# Patient Record
Sex: MALE | Employment: OTHER | URBAN - METROPOLITAN AREA
[De-identification: names, ages, dates, MRNs, and addresses within clinical notes are randomized per-mention and may not be internally consistent; named-entity substitution may affect disease eponyms.]

---

## 2019-11-04 ENCOUNTER — ANESTHESIA EVENT (OUTPATIENT)
Dept: ENDOSCOPY | Age: 69
End: 2019-11-04
Payer: MEDICARE

## 2019-11-05 ENCOUNTER — ANESTHESIA (OUTPATIENT)
Dept: ENDOSCOPY | Age: 69
End: 2019-11-05
Payer: MEDICARE

## 2019-11-05 ENCOUNTER — HOSPITAL ENCOUNTER (OUTPATIENT)
Age: 69
Setting detail: OUTPATIENT SURGERY
Discharge: HOME | End: 2019-11-05
Attending: INTERNAL MEDICINE | Admitting: INTERNAL MEDICINE
Payer: MEDICARE

## 2019-11-05 VITALS
RESPIRATION RATE: 16 BRPM | TEMPERATURE: 98 F | WEIGHT: 175 LBS | HEART RATE: 58 BPM | DIASTOLIC BLOOD PRESSURE: 64 MMHG | SYSTOLIC BLOOD PRESSURE: 115 MMHG | BODY MASS INDEX: 24.41 KG/M2 | OXYGEN SATURATION: 99 %

## 2019-11-05 LAB
ATRIAL RATE: 49 BPM
CALCULATED P AXIS, ECG09: 75 DEGREES
CALCULATED R AXIS, ECG10: -15 DEGREES
CALCULATED T AXIS, ECG11: 50 DEGREES
DIAGNOSIS, 93000: NORMAL
P-R INTERVAL, ECG05: 146 MS
Q-T INTERVAL, ECG07: 440 MS
QRS DURATION, ECG06: 64 MS
QTC CALCULATION (BEZET), ECG08: 397 MS
VENTRICULAR RATE, ECG03: 49 BPM

## 2019-11-05 PROCEDURE — 74011250636 HC RX REV CODE- 250/636: Performed by: NURSE ANESTHETIST, CERTIFIED REGISTERED

## 2019-11-05 PROCEDURE — 93005 ELECTROCARDIOGRAM TRACING: CPT

## 2019-11-05 PROCEDURE — 77030038604 HC SNR ENDO EXACTO USEN -B: Performed by: INTERNAL MEDICINE

## 2019-11-05 PROCEDURE — 77030031670 HC DEV INFL ENDOTEK BIG60 MRTM -B: Performed by: INTERNAL MEDICINE

## 2019-11-05 PROCEDURE — 88305 TISSUE EXAM BY PATHOLOGIST: CPT

## 2019-11-05 PROCEDURE — 76040000019: Performed by: INTERNAL MEDICINE

## 2019-11-05 PROCEDURE — 76060000031 HC ANESTHESIA FIRST 0.5 HR: Performed by: INTERNAL MEDICINE

## 2019-11-05 RX ORDER — SODIUM CHLORIDE, SODIUM LACTATE, POTASSIUM CHLORIDE, CALCIUM CHLORIDE 600; 310; 30; 20 MG/100ML; MG/100ML; MG/100ML; MG/100ML
50 INJECTION, SOLUTION INTRAVENOUS CONTINUOUS
Status: DISCONTINUED | OUTPATIENT
Start: 2019-11-06 | End: 2019-11-05 | Stop reason: HOSPADM

## 2019-11-05 RX ORDER — SODIUM CHLORIDE 0.9 % (FLUSH) 0.9 %
5-40 SYRINGE (ML) INJECTION EVERY 8 HOURS
Status: CANCELLED | OUTPATIENT
Start: 2019-11-05

## 2019-11-05 RX ORDER — EPINEPHRINE 0.1 MG/ML
1 INJECTION INTRACARDIAC; INTRAVENOUS
Status: CANCELLED | OUTPATIENT
Start: 2019-11-05 | End: 2019-11-06

## 2019-11-05 RX ORDER — IBUPROFEN 600 MG/1
TABLET ORAL
COMMUNITY

## 2019-11-05 RX ORDER — DEXTROMETHORPHAN/PSEUDOEPHED 2.5-7.5/.8
1.2 DROPS ORAL
Status: CANCELLED | OUTPATIENT
Start: 2019-11-05

## 2019-11-05 RX ORDER — FLUMAZENIL 0.1 MG/ML
0.2 INJECTION INTRAVENOUS
Status: CANCELLED | OUTPATIENT
Start: 2019-11-05 | End: 2019-11-05

## 2019-11-05 RX ORDER — SODIUM CHLORIDE 0.9 % (FLUSH) 0.9 %
5-40 SYRINGE (ML) INJECTION AS NEEDED
Status: DISCONTINUED | OUTPATIENT
Start: 2019-11-05 | End: 2019-11-05 | Stop reason: HOSPADM

## 2019-11-05 RX ORDER — NALOXONE HYDROCHLORIDE 0.4 MG/ML
0.4 INJECTION, SOLUTION INTRAMUSCULAR; INTRAVENOUS; SUBCUTANEOUS
Status: CANCELLED | OUTPATIENT
Start: 2019-11-05 | End: 2019-11-05

## 2019-11-05 RX ORDER — SODIUM CHLORIDE 0.9 % (FLUSH) 0.9 %
5-40 SYRINGE (ML) INJECTION EVERY 8 HOURS
Status: DISCONTINUED | OUTPATIENT
Start: 2019-11-05 | End: 2019-11-05 | Stop reason: HOSPADM

## 2019-11-05 RX ORDER — CLOPIDOGREL BISULFATE 75 MG/1
TABLET ORAL DAILY
COMMUNITY

## 2019-11-05 RX ORDER — PROPOFOL 10 MG/ML
INJECTION, EMULSION INTRAVENOUS AS NEEDED
Status: DISCONTINUED | OUTPATIENT
Start: 2019-11-05 | End: 2019-11-05 | Stop reason: HOSPADM

## 2019-11-05 RX ORDER — INSULIN LISPRO 100 [IU]/ML
INJECTION, SOLUTION INTRAVENOUS; SUBCUTANEOUS ONCE
Status: DISCONTINUED | OUTPATIENT
Start: 2019-11-06 | End: 2019-11-05 | Stop reason: HOSPADM

## 2019-11-05 RX ORDER — ATROPINE SULFATE 0.1 MG/ML
0.5 INJECTION INTRAVENOUS
Status: CANCELLED | OUTPATIENT
Start: 2019-11-05 | End: 2019-11-06

## 2019-11-05 RX ORDER — FAMOTIDINE 20 MG/1
20 TABLET, FILM COATED ORAL ONCE
Status: DISCONTINUED | OUTPATIENT
Start: 2019-11-06 | End: 2019-11-05 | Stop reason: HOSPADM

## 2019-11-05 RX ORDER — SODIUM CHLORIDE 0.9 % (FLUSH) 0.9 %
5-40 SYRINGE (ML) INJECTION AS NEEDED
Status: CANCELLED | OUTPATIENT
Start: 2019-11-05

## 2019-11-05 RX ADMIN — PROPOFOL 20 MG: 10 INJECTION, EMULSION INTRAVENOUS at 08:25

## 2019-11-05 RX ADMIN — SODIUM CHLORIDE, SODIUM LACTATE, POTASSIUM CHLORIDE, AND CALCIUM CHLORIDE 50 ML/HR: 600; 310; 30; 20 INJECTION, SOLUTION INTRAVENOUS at 07:36

## 2019-11-05 RX ADMIN — PROPOFOL 20 MG: 10 INJECTION, EMULSION INTRAVENOUS at 08:12

## 2019-11-05 RX ADMIN — PROPOFOL 20 MG: 10 INJECTION, EMULSION INTRAVENOUS at 08:19

## 2019-11-05 RX ADMIN — PROPOFOL 30 MG: 10 INJECTION, EMULSION INTRAVENOUS at 08:09

## 2019-11-05 RX ADMIN — PROPOFOL 50 MG: 10 INJECTION, EMULSION INTRAVENOUS at 08:08

## 2019-11-05 RX ADMIN — PROPOFOL 20 MG: 10 INJECTION, EMULSION INTRAVENOUS at 08:17

## 2019-11-05 RX ADMIN — PROPOFOL 20 MG: 10 INJECTION, EMULSION INTRAVENOUS at 08:14

## 2019-11-05 RX ADMIN — PROPOFOL 20 MG: 10 INJECTION, EMULSION INTRAVENOUS at 08:22

## 2019-11-05 RX ADMIN — PROPOFOL 20 MG: 10 INJECTION, EMULSION INTRAVENOUS at 08:10

## 2019-11-05 RX ADMIN — PROPOFOL 20 MG: 10 INJECTION, EMULSION INTRAVENOUS at 08:28

## 2019-11-05 NOTE — ANESTHESIA POSTPROCEDURE EVALUATION
Procedure(s):  COLONOSCOPY. No value filed.     Anesthesia Post Evaluation      Multimodal analgesia: multimodal analgesia not used between 6 hours prior to anesthesia start to PACU discharge  Patient location during evaluation: bedside  Patient participation: complete - patient participated  Level of consciousness: awake  Pain score: 0  Pain management: adequate  Airway patency: patent  Anesthetic complications: no  Cardiovascular status: acceptable  Respiratory status: acceptable  Hydration status: acceptable  Post anesthesia nausea and vomiting:  none      Vitals Value Taken Time   /64 11/5/2019  8:31 AM   Temp 36.7 °C (98 °F) 11/5/2019  8:31 AM   Pulse 58 11/5/2019  8:31 AM   Resp 16 11/5/2019  8:31 AM   SpO2 99 % 11/5/2019  8:31 AM
electronic

## 2019-11-05 NOTE — ANESTHESIA PREPROCEDURE EVALUATION
Anesthetic History   No history of anesthetic complications            Review of Systems / Medical History  Patient summary reviewed, nursing notes reviewed and pertinent labs reviewed    Pulmonary          Smoker         Neuro/Psych              Cardiovascular    Hypertension          CAD    Exercise tolerance: >4 METS     GI/Hepatic/Renal  Within defined limits              Endo/Other  Within defined limits           Other Findings              Physical Exam    Airway  Mallampati: II  TM Distance: > 6 cm  Neck ROM: normal range of motion        Cardiovascular  Regular rate and rhythm,  S1 and S2 normal,  no murmur, click, rub, or gallop             Dental    Dentition: Full lower dentures and Full upper dentures     Pulmonary                 Abdominal         Other Findings            Anesthetic Plan    ASA: 3  Anesthesia type: MAC          Induction: Intravenous  Anesthetic plan and risks discussed with: Patient

## 2019-11-05 NOTE — PERIOP NOTES
Pre-Op Summary    Pt arrived via car with family/friend and is oriented to time, place, person and situation. Patient with steady gait with none assistive devices. Visit Vitals  /74   Pulse (!) 53   Temp 98.4 °F (36.9 °C)   Resp 16   Wt 79.4 kg (175 lb)   SpO2 94%   BMI 24.41 kg/m²       Peripheral IV located on Right hand . Patients belongings are located at bedside. Patient's point of contact is Lesa Padron, friend and their contact number is: NA. They will be in the waiting room. They are able to receive medication information. They will be their ride home.

## 2019-11-05 NOTE — PERIOP NOTES
Phase 2 Endo-Recovery Summary  Report received from Niko Bronx    Vitals:    11/05/19 0719 11/05/19 0725 11/05/19 0831   BP: 155/74  115/64   Pulse:  (!) 53 (!) 58   Resp: 16  16   Temp: 98.4 °F (36.9 °C)  98 °F (36.7 °C)   SpO2: 94%  99%   Weight: 79.4 kg (175 lb)         oriented to time, place, person and situation    Patient up in chair and friend at the bedside. Lines and Drains  Peripheral Intravenous Line: removed at discharge        Doctor came in to speak to patient and family. Patient assisted to chair with minimal assist. Pateint tolerating liquids well. Patient's family at bedside. Discharge discussed with patient and family. No questions or concerns at this time. Patient had time to ask any questions. Patient discharged to home, with friend.       Kim Lindsay RN

## 2019-11-05 NOTE — H&P
History and Physical    Fawn Fearing  1950  [id number]  [id number]    Pre-Procedure Diagnosis:  History of Colon Polyps- Z86.010      Evaluation of past illnesses, surgeries, or injuries:   YES  Past Medical History:   Diagnosis Date    CAD (coronary artery disease)     Chronic pain     BACK AND LEG PAIN    Hypertension     MI, old      Past Surgical History:   Procedure Laterality Date    CARDIAC SURG PROCEDURE UNLIST      HX MI 2005 STENTS    HX OTHER SURGICAL      PROSTATE       Allergies: Allergies   Allergen Reactions    Morphine Hives       Previous reactions to sedation/analgesia? NO    Review of current medications, supplement, herbals and nutraceuticals complete:  YES  Current Facility-Administered Medications   Medication Dose Route Frequency Provider Last Rate Last Dose    [START ON 11/6/2019] lactated Ringers infusion  50 mL/hr IntraVENous CONTINUOUS Bill Lo CRNA 50 mL/hr at 11/05/19 0736 50 mL/hr at 11/05/19 0736    sodium chloride (NS) flush 5-40 mL  5-40 mL IntraVENous Q8H Bill Lo CRNA        sodium chloride (NS) flush 5-40 mL  5-40 mL IntraVENous PRN Bill Lo CRNA        [START ON 11/6/2019] famotidine (PEPCID) tablet 20 mg  20 mg Oral Lenin Lopez CRNA        [START ON 11/6/2019] insulin lispro (HUMALOG) injection   SubCUTAneous ONCE Bill Lo CRNA              Pertinent labs reviewed? YES    History of substance abuse?   NO  Family History   Problem Relation Age of Onset    Heart Disease Mother     Cancer Father      Social History     Socioeconomic History    Marital status:      Spouse name: Not on file    Number of children: Not on file    Years of education: Not on file    Highest education level: Not on file   Occupational History    Not on file   Social Needs    Financial resource strain: Not on file    Food insecurity:     Worry: Not on file     Inability: Not on file    Transportation needs:     Medical: Not on file Non-medical: Not on file   Tobacco Use    Smoking status: Current Some Day Smoker     Packs/day: 0.25     Years: 5.00     Pack years: 1.25    Smokeless tobacco: Never Used   Substance and Sexual Activity    Alcohol use:  Yes     Alcohol/week: 2.0 standard drinks     Types: 2 Cans of beer per week     Comment: MONTH    Drug use: No    Sexual activity: Not on file   Lifestyle    Physical activity:     Days per week: Not on file     Minutes per session: Not on file    Stress: Not on file   Relationships    Social connections:     Talks on phone: Not on file     Gets together: Not on file     Attends Scientologist service: Not on file     Active member of club or organization: Not on file     Attends meetings of clubs or organizations: Not on file     Relationship status: Not on file    Intimate partner violence:     Fear of current or ex partner: Not on file     Emotionally abused: Not on file     Physically abused: Not on file     Forced sexual activity: Not on file   Other Topics Concern    Not on file   Social History Narrative    Not on file       Cardiac Status:  Known coronary disease with prior stent placement    Mental Status:  WNL     Pulmonary Status:  WNL    NPO:  >4    Assessment/Impression: COlon polyp surveillance, history of polyps    Plan of treatment: Colonoscopy         Abhishek Padron MD  11/5/2019  7:50 AM

## 2019-11-05 NOTE — PROCEDURES
Colonoscopy Report    Patient: Tania Escobedo MRN: [id number]  SSN: [id number]    YOB: 1950  Age: 71 y.o. Sex: male      Date of Procedure: 11/5/2019    IMPRESSION:  1. Five small polyps removed  2. Few diverticula  3. Medium sized internal hemorrhoids    RECOMMENDATIONS:  1. Resume regular diet, Recommend high fiber. 2. Will contact with polyp results in 2 weeks. These results will determine timing to next screening. Patient will be instructed to contact our office if they have not received the results by three weeks. 3. Restart plavix tomorrow    Indication:  Personal history of colon polyps (screening only)  Procedure Performed: Colonoscopy polypectomy (cold snare)  Endoscopist: Caron Davis MD  Assistant: Endoscopy Technician-1: Amaya Madrid  Endoscopy RN-1: Afia Navas RN  ASA: Per Anesthesia  Mallampati Score: Per anesthesia report  Anesthesia: MAC anesthesia  Endoscope:     [x]  CF H 190AL   []  PCF H190AL   []  GIF H 190    Extent of Examination:terminal ileum  Quality of Preparation:     []  Excellent   [x]  Very Good   [] Fair but adequate   [] Fair, inadequate   []  Poor      Technique: The procedure was discussed with the patient including risks, benefits, alternatives including risks of IV sedation, bleeding, perforation and missed polyp. A safety timeout was performed. The patient was given incremental doses of intravenous sedation to achieve moderate sedation. The patients vital signs were monitored at all times including heart rate, rhythm, blood pressure and oxygen saturation. The patient was placed in left lateral position. When adequate sedation was achieved a perianal inspection and a digital rectal exam were performed. Video colonoscope was introduced into the rectum and advanced under direct vision up to the terminal ileum. The cecum was identified by IC valve, appendiceal orifice and crows foot.  With adequate insufflation and maneuvering of the withdrawing scope, the colonic mucosa was visualized carefully. Retroflexion was performed in the rectum and the distal rectum visualized. The patient tolerated the procedure very well and was transferred to recovery area. Findings:  1. Normal rectal examination with medium sized internal hemorrhoids in the anorectum  2. A few scattered diverticula were noted throughout the colon  3. A 3mm sessile polyp was removed with cold snare from the the proximal ascending colon adjacent to the ileocecal valve  4.  Four 2-3mm sessile rectal polyps were removed with cold snare; three appeared hyperplastic and one appeared adenomatous      EBL:Minimal  Specimen:   ID Type Source Tests Collected by Time Destination   1 : (cold snare) polyp  Preservative Colon, Ascending  Aj Franco MD 11/5/2019 4124 Pathology   2 : (cold snare) polyps x 4  Preservative Rectum  Aj Franco MD 11/5/2019 0831 Pathology       Manpreet Rodriguez MD  November 5, 2019  8:35 AM

## 2019-11-05 NOTE — DISCHARGE INSTRUCTIONS
Patient Discharge Instructions    Tania Gregg / [id number] : 1950    Admitted 2019 Discharged: 2019         IMPRESSION:  1. Five small polyps removed  2. Few diverticula  3. Medium sized internal hemorrhoids    RECOMMENDATIONS:  1. Resume regular diet, Recommend high fiber. 2. Will contact with polyp results in 2 weeks. These results will determine timing to next screening. Patient will be instructed to contact our office if they have not received the results by three weeks. 3. Restart plavix tomorrow    Recommended Diet: Regular Diet high in fiber    Recommended Activity:    1. Do not drink alcohol, drive or operate machinery for 12 hours   2. Call if any fever, abdominal pain or bleeding noted. Signed By: Caron Davis MD     2019         DISCHARGE SUMMARY from Nurse    PATIENT INSTRUCTIONS:    After general anesthesia or intravenous sedation, for 24 hours or while taking prescription Narcotics:  · Limit your activities  · Do not drive and operate hazardous machinery  · Do not make important personal or business decisions  · Do  not drink alcoholic beverages  · If you have not urinated within 8 hours after discharge, please contact your surgeon on call. Report the following to your surgeon:  · Excessive pain, swelling, redness or odor of or around the surgical area  · Temperature over 100.5  · Nausea and vomiting lasting longer than 4 hours or if unable to take medications  · Any signs of decreased circulation or nerve impairment to extremity: change in color, persistent  numbness, tingling, coldness or increase pain  · Any questions    These are general instructions for a healthy lifestyle:    No smoking/ No tobacco products/ Avoid exposure to second hand smoke  Surgeon General's Warning:  Quitting smoking now greatly reduces serious risk to your health.     Obesity, smoking, and sedentary lifestyle greatly increases your risk for illness    A healthy diet, regular physical exercise & weight monitoring are important for maintaining a healthy lifestyle    You may be retaining fluid if you have a history of heart failure or if you experience any of the following symptoms:  Weight gain of 3 pounds or more overnight or 5 pounds in a week, increased swelling in our hands or feet or shortness of breath while lying flat in bed. Please call your doctor as soon as you notice any of these symptoms; do not wait until your next office visit. The discharge information has been reviewed with the {PATIENT PARENT GUARDIAN:76099}. The {PATIENT PARENT GUARDIAN:18698} verbalized understanding. Discharge medications reviewed with the {Dishcarge meds reviewed LRIW:03175} and appropriate educational materials and side effects teaching were provided.   ___________________________________________________________________________________________________________________________________

## 2019-11-18 ENCOUNTER — HOSPITAL ENCOUNTER (OUTPATIENT)
Dept: CT IMAGING | Age: 69
Discharge: HOME | End: 2019-11-18
Attending: INTERNAL MEDICINE
Payer: MEDICARE

## 2019-11-18 DIAGNOSIS — R31.0 GROSS HEMATURIA: ICD-10-CM

## 2019-11-18 LAB — CREAT UR-MCNC: 0.9 MG/DL (ref 0.6–1.3)

## 2019-11-18 PROCEDURE — 74011636320 HC RX REV CODE- 636/320: Performed by: INTERNAL MEDICINE

## 2019-11-18 PROCEDURE — 74178 CT ABD&PLV WO CNTR FLWD CNTR: CPT

## 2019-11-18 PROCEDURE — 82565 ASSAY OF CREATININE: CPT

## 2019-11-18 RX ADMIN — IOPAMIDOL 500 ML: 612 INJECTION, SOLUTION INTRAVENOUS at 09:41

## 2021-12-01 ENCOUNTER — OFFICE VISIT (OUTPATIENT)
Dept: VASCULAR SURGERY | Age: 71
End: 2021-12-01
Payer: MEDICARE

## 2021-12-01 VITALS
HEART RATE: 65 BPM | SYSTOLIC BLOOD PRESSURE: 136 MMHG | HEIGHT: 71 IN | BODY MASS INDEX: 24.5 KG/M2 | DIASTOLIC BLOOD PRESSURE: 62 MMHG | OXYGEN SATURATION: 97 % | WEIGHT: 175 LBS

## 2021-12-01 DIAGNOSIS — I70.219 EXTREMITY ATHEROSCLEROSIS WITH INTERMITTENT CLAUDICATION (HCC): Primary | ICD-10-CM

## 2021-12-01 PROCEDURE — G8536 NO DOC ELDER MAL SCRN: HCPCS | Performed by: STUDENT IN AN ORGANIZED HEALTH CARE EDUCATION/TRAINING PROGRAM

## 2021-12-01 PROCEDURE — G8420 CALC BMI NORM PARAMETERS: HCPCS | Performed by: STUDENT IN AN ORGANIZED HEALTH CARE EDUCATION/TRAINING PROGRAM

## 2021-12-01 PROCEDURE — G8510 SCR DEP NEG, NO PLAN REQD: HCPCS | Performed by: STUDENT IN AN ORGANIZED HEALTH CARE EDUCATION/TRAINING PROGRAM

## 2021-12-01 PROCEDURE — 1101F PT FALLS ASSESS-DOCD LE1/YR: CPT | Performed by: STUDENT IN AN ORGANIZED HEALTH CARE EDUCATION/TRAINING PROGRAM

## 2021-12-01 PROCEDURE — 3017F COLORECTAL CA SCREEN DOC REV: CPT | Performed by: STUDENT IN AN ORGANIZED HEALTH CARE EDUCATION/TRAINING PROGRAM

## 2021-12-01 PROCEDURE — 99203 OFFICE O/P NEW LOW 30 MIN: CPT | Performed by: STUDENT IN AN ORGANIZED HEALTH CARE EDUCATION/TRAINING PROGRAM

## 2021-12-01 PROCEDURE — G8427 DOCREV CUR MEDS BY ELIG CLIN: HCPCS | Performed by: STUDENT IN AN ORGANIZED HEALTH CARE EDUCATION/TRAINING PROGRAM

## 2021-12-01 NOTE — PROGRESS NOTES
1. Have you been to an emergency room or urgent care clinic since your last visit? No    Hospitalized since your last visit? If yes, where, when, and reason for visit? No  2. Have you seen or consulted any other health care providers outside of the [facility] since your last visit including any procedures, health maintenance items. If yes, where, when and reason for visit?  No

## 2022-01-11 NOTE — PROGRESS NOTES
01/11/22        [facility] History and Physical    Chuy Dunlap. is a 70 y.o. male here for evaluation of bilateral lower extremity claudication. Patient was worked up at [facility] for evaluation and possible angiogram of lower extremities. Patient states he has lifestyle debilitating claudication. He denies any neurologic symptoms, abdominal pain, chest pain, shortness of breath. On my examination patient is well appearing, palpable radial pulses bilaterally, palpable but diminished femoral pulses with dopplerable pedal pulses. JR with PVR shows inflow disease bilaterally with concern for aortoiliac disease. I discussed the need for CTA vs angiogram and patient would like to proceed with an angiogram but will call us if symptoms return. Physical Exam:    Visit Vitals  /62 (BP 1 Location: Left upper arm, BP Patient Position: Sitting)   Pulse 65   Ht 5' 11\" (1.803 m)   Wt 175 lb (79.4 kg)   SpO2 97%   BMI 24.41 kg/m²      HEENT-PERRLA exactly movements intact, no scleral icterus, mucous membranes pink and moist  Neck-no JVD, no carotid bruits  Heart-regular rate and rhythm no murmurs, rubs or gallops  Chest-clear to auscultation bilaterally no wheezes, rhonchi or rubs  Abdomen-soft, nontender, no palpable organomegaly or masses, no palpable pulsatile masses  Extremities-no clubbing, cyanosis or edema. No wounds or gangrenous changes to the toes or feet. Skin is intact. Feet are pink warm and well-perfused bilaterally  Pulses-carotid, radial, brachial, femoral 2+ bilaterally.  Bilateral doppler signals dorsalis pedis, posterior tibial       Past Medical History:   Diagnosis Date    CAD (coronary artery disease)     Chronic pain     BACK AND LEG PAIN    Hypertension     MI, old      Past Surgical History:   Procedure Laterality Date    COLONOSCOPY N/A 11/5/2019    COLONOSCOPY performed by Fina Waldron MD at [address] CARDIAC SURG PROCEDURE UNLIST      HX MI 2005 STENTS     There is no problem list on file for this patient. Current Outpatient Medications   Medication Sig Dispense Refill    clopidogrel (PLAVIX) 75 mg tab Take  by mouth daily.  ibuprofen (MOTRIN) 600 mg tablet Take  by mouth every six (6) hours as needed for Pain.  Cholecalciferol, Vitamin D3, (VITAMIN D3) 2,000 unit cap capsule Take 2,000 Units by mouth daily.  lisinopril-hydrochlorothiazide (PRINZIDE, ZESTORETIC) 20-25 mg per tablet Take 1 Tab by mouth daily. Indications: HYPERTENSION      tamsulosin (FLOMAX) 0.4 mg capsule Take 0.4 mg by mouth two (2) times a day.  oxyCODONE-acetaminophen (PERCOCET) 5-325 mg per tablet Take 1-2 Tabs by mouth every four (4) hours as needed. Max Daily Amount: 12 Tabs. (Patient not taking: Reported on 12/1/2021) 40 Tab 0     Allergies   Allergen Reactions    Morphine Hives     Social History     Socioeconomic History    Marital status:      Spouse name: Not on file    Number of children: Not on file    Years of education: Not on file    Highest education level: Not on file   Occupational History    Not on file   Tobacco Use    Smoking status: Current Some Day Smoker     Packs/day: 0.25     Years: 5.00     Pack years: 1.25     Types: Cigars    Smokeless tobacco: Never Used   Vaping Use    Vaping Use: Never used   Substance and Sexual Activity    Alcohol use:  Yes     Alcohol/week: 2.0 standard drinks     Types: 2 Cans of beer per week     Comment: MONTH    Drug use: No    Sexual activity: Not on file   Other Topics Concern    Not on file   Social History Narrative    Not on file     Social Determinants of Health     Financial Resource Strain:     Difficulty of Paying Living Expenses: Not on file   Food Insecurity:     Worried About Running Out of Food in the Last Year: Not on file    Marah of Food in the Last Year: Not on file   Transportation Needs:     Lack of Transportation (Medical): Not on file    Lack of Transportation (Non-Medical): Not on file   Physical Activity:     Days of Exercise per Week: Not on file    Minutes of Exercise per Session: Not on file   Stress:     Feeling of Stress : Not on file   Social Connections:     Frequency of Communication with Friends and Family: Not on file    Frequency of Social Gatherings with Friends and Family: Not on file    Attends Gnosticism Services: Not on file    Active Member of [address] [place] [address] [employer] or Organizations: Not on file    Attends Club or Organization Meetings: Not on file    Marital Status: Not on file   Intimate Partner Violence:     Fear of Current or Ex-Partner: Not on file    Emotionally Abused: Not on file    Physically Abused: Not on file    Sexually Abused: Not on file   Housing Stability:     Unable to Pay for Housing in the Last Year: Not on file    Number of [place] in the Last Year: Not on file    Unstable Housing in the Last Year: Not on file     Family History   Problem Relation Age of Onset    Heart Disease Mother     Cancer Father        We reviewed the plan with the patient and the patient understands.         Rayne Kang MD

## 2022-01-12 ENCOUNTER — DOCUMENTATION ONLY (OUTPATIENT)
Dept: VASCULAR SURGERY | Age: 72
End: 2022-01-12

## 2022-01-12 NOTE — PROGRESS NOTES
Dr Yadira Hein spoke to patient and he has quit smoking and lost weight doing much better. He states he is feeling great and does not think he needs anything done at this time. Angiogram was canceled because of his improvement.

## 2022-02-23 ENCOUNTER — OFFICE VISIT (OUTPATIENT)
Dept: VASCULAR SURGERY | Age: 72
End: 2022-02-23
Payer: MEDICARE

## 2022-02-23 VITALS
HEART RATE: 60 BPM | HEIGHT: 71 IN | BODY MASS INDEX: 24.51 KG/M2 | SYSTOLIC BLOOD PRESSURE: 134 MMHG | OXYGEN SATURATION: 97 % | WEIGHT: 175.04 LBS | DIASTOLIC BLOOD PRESSURE: 80 MMHG

## 2022-02-23 DIAGNOSIS — I70.219 EXTREMITY ATHEROSCLEROSIS WITH INTERMITTENT CLAUDICATION (HCC): Primary | ICD-10-CM

## 2022-02-23 PROCEDURE — 1101F PT FALLS ASSESS-DOCD LE1/YR: CPT | Performed by: STUDENT IN AN ORGANIZED HEALTH CARE EDUCATION/TRAINING PROGRAM

## 2022-02-23 PROCEDURE — G8536 NO DOC ELDER MAL SCRN: HCPCS | Performed by: STUDENT IN AN ORGANIZED HEALTH CARE EDUCATION/TRAINING PROGRAM

## 2022-02-23 PROCEDURE — 99212 OFFICE O/P EST SF 10 MIN: CPT | Performed by: STUDENT IN AN ORGANIZED HEALTH CARE EDUCATION/TRAINING PROGRAM

## 2022-02-23 PROCEDURE — 3017F COLORECTAL CA SCREEN DOC REV: CPT | Performed by: STUDENT IN AN ORGANIZED HEALTH CARE EDUCATION/TRAINING PROGRAM

## 2022-02-23 PROCEDURE — G8427 DOCREV CUR MEDS BY ELIG CLIN: HCPCS | Performed by: STUDENT IN AN ORGANIZED HEALTH CARE EDUCATION/TRAINING PROGRAM

## 2022-02-23 PROCEDURE — G8510 SCR DEP NEG, NO PLAN REQD: HCPCS | Performed by: STUDENT IN AN ORGANIZED HEALTH CARE EDUCATION/TRAINING PROGRAM

## 2022-02-23 PROCEDURE — G8420 CALC BMI NORM PARAMETERS: HCPCS | Performed by: STUDENT IN AN ORGANIZED HEALTH CARE EDUCATION/TRAINING PROGRAM

## 2022-02-23 NOTE — PROGRESS NOTES
1. Have you been to an emergency room or urgent care clinic since your last visit? No    Hospitalized since your last visit? If yes, where, when, and reason for visit? No  2. Have you seen or consulted any other health care providers outside of the [facility] since your last visit including any procedures, health maintenance items. If yes, where, when and reason for visit?  No

## 2022-02-23 NOTE — PROGRESS NOTES
02/23/22        Pepe Ledbetter. History and Physical    Pepe Cox is a 70 y.o. male who I initially saw on 1/11/22 for complains of bilateral lower extremity claudication which had been worked up at [facility]. Patient was offered surgery prior to which he saw me. On my initial evaluation, patient had evidence of aortoiliac disease suggested by diminished JR and PVRs bilaterally. We had discussed imaging vs surgery after making lifestyle modification. Since our conversation, patient has stopped smoking, has started exercising and made dietary changes. He states his symptoms have completely resolved since he has made those changes and feels excellent. He denies any chest pain, shortness of breath, abdominal pain, no signs of stroke, no claudication, rest pain nor any evidence of tissue loss. On my examination, he has palpable femoral pulses, non palpable popliteal pulses. Both feet are warm with no sensorimotor changes. I applauded his lifestyle changes and given how well they've worked we will continue on the same treatment plan of lifestyle modification and I will see him again in 3 months without studies. Physical Exam:    Visit Vitals  /80 (BP 1 Location: Left upper arm, BP Patient Position: Sitting)   Pulse 60   Ht 5' 11\" (1.803 m)   Wt 175 lb 0.7 oz (79.4 kg)   SpO2 97%   BMI 24.41 kg/m²      HEENT-PERRLA exactly movements intact, no scleral icterus, mucous membranes pink and moist  Neck-no JVD, no carotid bruits  Heart-regular rate and rhythm no murmurs, rubs or gallops  Chest-clear to auscultation bilaterally no wheezes, rhonchi or rubs  Abdomen-soft, nontender, no palpable organomegaly or masses, no palpable pulsatile masses  Extremities-no clubbing, cyanosis or edema. No wounds or gangrenous changes to the toes or feet. Skin is intact. Feet are pink warm and well-perfused bilaterally  Pulses-carotid, radial, brachial, femoral 2+ bilaterally.  Bilateral doppler signals dorsalis pedis, posterior tibial       Past Medical History:   Diagnosis Date    CAD (coronary artery disease)     Chronic pain     BACK AND LEG PAIN    Hypertension     MI, old      Past Surgical History:   Procedure Laterality Date    COLONOSCOPY N/A 11/5/2019    [facility]os [facility] ENDOSCOPY    HX OTHER SURGICAL      PROSTATE    [place] CARDIAC SURG PROCEDURE UNLIST      HX MI 2005 STENTS     There is no problem list on file for this patient. Current Outpatient Medications   Medication Sig Dispense Refill    clopidogrel (PLAVIX) 75 mg tab Take  by mouth daily.  ibuprofen (MOTRIN) 600 mg tablet Take  by mouth every six (6) hours as needed for Pain.  Cholecalciferol, Vitamin D3, (VITAMIN D3) 2,000 unit cap capsule Take 2,000 Units by mouth daily.  lisinopril-hydrochlorothiazide (PRINZIDE, ZESTORETIC) 20-25 mg per tablet Take 1 Tab by mouth daily. Indications: HYPERTENSION      tamsulosin (FLOMAX) 0.4 mg capsule Take 0.4 mg by mouth two (2) times a day.  oxyCODONE-acetaminophen (PERCOCET) 5-325 mg per tablet Take 1-2 Tabs by mouth every four (4) hours as needed. Max Daily Amount: 12 Tabs. (Patient not taking: Reported on 12/1/2021) 40 Tab 0     Allergies   Allergen Reactions    Morphine Hives     Social History     Socioeconomic History    Marital status:      Spouse name: Not on file    Number of children: Not on file    Years of education: Not on file    Highest education level: Not on file   Occupational History    Not on file   Tobacco Use    Smoking status: Former Smoker     Packs/day: 0.25     Years: 5.00     Pack years: 1.25     Types: Cigars    Smokeless tobacco: Never Used   Vaping Use    Vaping Use: Never used   Substance and Sexual Activity    Alcohol use:  Yes     Alcohol/week: 2.0 standard drinks     Types: 2 Cans of beer per week     Comment: MONTH    Drug use: No    Sexual activity: Not on file   Other Topics Concern    Not on file   Social History Narrative    Not on file     Social Determinants of Health     Financial Resource Strain:     Difficulty of Paying Living Expenses: Not on file   Food Insecurity:     Worried About Running Out of Food in the Last Year: Not on file    Marah of Food in the Last Year: Not on file   Transportation Needs:     Lack of Transportation (Medical): Not on file    Lack of Transportation (Non-Medical): Not on file   Physical Activity:     Days of Exercise per Week: Not on file    Minutes of Exercise per Session: Not on file   Stress:     Feeling of Stress : Not on file   Social Connections:     Frequency of Communication with Friends and Family: Not on file    Frequency of Social Gatherings with Friends and Family: Not on file    Attends Pentecostal Services: Not on file    Active Member of [address] [place] [address] or Organizations: Not on file    Attends Club or Organization Meetings: Not on file    Marital Status: Not on file   Intimate Partner Violence:     Fear of Current or Ex-Partner: Not on file    Emotionally Abused: Not on file    Physically Abused: Not on file    Sexually Abused: Not on file   Housing Stability:     Unable to Pay for Housing in the Last Year: Not on file    Number of [place] in the Last Year: Not on file    Unstable Housing in the Last Year: Not on file     Family History   Problem Relation Age of Onset    Heart Disease Mother     Cancer Father        We reviewed the plan with the patient and the patient understands.         Areli Berman MD

## (undated) DEVICE — KIT COLON W/ 1.1OZ LUB AND 2 END

## (undated) DEVICE — TRAP SPEC COLL POLYP POLYSTYR --

## (undated) DEVICE — FLEX ADVANTAGE 1500CC: Brand: FLEX ADVANTAGE

## (undated) DEVICE — SYR 50ML SLIP TIP NSAF LF STRL --

## (undated) DEVICE — CONTAINER PREFIL FRMLN 15ML --

## (undated) DEVICE — TUBING, SUCTION, 3/16" X 6', STRAIGHT: Brand: MEDLINE

## (undated) DEVICE — KENDALL 500 SERIES DIAPHORETIC FOAM MONITORING ELECTRODE - TEAR DROP SHAPE ( 30/PK): Brand: KENDALL

## (undated) DEVICE — SNARE ENDO 2.4MMX230CM -- COLD EXACTO

## (undated) DEVICE — MEDI-VAC NON-CONDUCTIVE SUCTION TUBING: Brand: CARDINAL HEALTH

## (undated) DEVICE — BASIN EMESIS 500CC ROSE 250/CS 60/PLT: Brand: MEDEGEN MEDICAL PRODUCTS, LLC

## (undated) DEVICE — DEVICE INFL 60ML 12ATM CONVENIENT LOK REL HNDL HI PRSS FLX